# Patient Record
Sex: MALE | URBAN - METROPOLITAN AREA
[De-identification: names, ages, dates, MRNs, and addresses within clinical notes are randomized per-mention and may not be internally consistent; named-entity substitution may affect disease eponyms.]

---

## 2023-04-19 PROBLEM — 302215000: Status: ACTIVE | Noted: 2023-04-19

## 2023-04-19 PROBLEM — 420054005: Status: ACTIVE | Noted: 2023-04-19

## 2023-04-19 PROBLEM — 235919008: Status: ACTIVE | Noted: 2023-04-19

## 2023-04-20 ENCOUNTER — OFFICE VISIT (OUTPATIENT)
Dept: URBAN - METROPOLITAN AREA CLINIC 63 | Facility: CLINIC | Age: 33
End: 2023-04-20

## 2023-04-20 NOTE — HPI-TODAY'S VISIT:
Trung is a pleasant 32-year-old male who presents for the need for recent hospitalization.  Recently discharged from the hospital 4/7/23 after presenting with seizure-like activity.  Surgery was consulted in light of imaging concerning for acute cholecystitis.  No surgical intervention was pursued.  Previously evaluated by [facility]. Serologic workup unremarkable except for elevated alpha-1 antitrypsin and elevated ceruloplasmin  Labs dated 4/7/23 showed RBC 4.06, hemoglobin 12.9, hematocrit 37.9, platelets 80.  PT 16.0, INR 1.28.  Creatinine 0.52.  GFR normal.  Alk phos 189, , ALT 71, total bilirubin 3.2.  TSH normal.  Hepatitis panel negative.  Lipase normal  CT abdomen/pelvis with contrast dated 4/6/2023 showed redemonstration of cholelithiasis now with circumferential wall thickening of the gallbladder wall measuring up to 4 mm.  No fluid or additional phonatory changes about the gallbladder.  Mild peripancreatic inflammatory changes nonspecific but may be secondary to mild acute interstitial pancreatitis.  Clinical correlation with serum lipase is recommended.  Redemonstration of cirrhotic morphology of the liver including a recanalized periumbilical vein and splenomegaly measuring up to 14.6 cm, previously 13.7 cm on MRI of October 2021.  No ascites.  Mild gastroesophageal varices and trace paraesophageal varices.  Bladder is also circumferentially mild thickened walled without surrounding inflammatory changes  Ultrasound dated 4/5/2023 showed cholelithiasis with mild gallbladder wall thickening.  Although the findings could be seen in acute cholecystitis the gallbladder appearance is not definitive for this entity and the gallbladder wall thickening could be related to underlying hepatic disease.  Cirrhotic and steatotic liver.  MRI/MRCP dated 4/6/2023 showed no evidence of biliary ductal dilatation.  No evidence of choledocholithiasis.  Morphologic evidence of cirrhosis with evidence of suggestive of portal hypertension including recanalized umbilical vein and trace ascites.  Bibasilar opacities which may represent infiltrate and/or atelectasis

## 2023-04-21 ENCOUNTER — DASHBOARD ENCOUNTERS (OUTPATIENT)
Age: 33
End: 2023-04-21

## 2023-04-24 ENCOUNTER — OFFICE VISIT (OUTPATIENT)
Dept: URBAN - METROPOLITAN AREA CLINIC 60 | Facility: CLINIC | Age: 33
End: 2023-04-24